# Patient Record
Sex: FEMALE | URBAN - METROPOLITAN AREA
[De-identification: names, ages, dates, MRNs, and addresses within clinical notes are randomized per-mention and may not be internally consistent; named-entity substitution may affect disease eponyms.]

---

## 2024-01-09 ENCOUNTER — TELEPHONE (OUTPATIENT)
Dept: SURGERY | Age: 54
End: 2024-01-09

## 2024-01-09 NOTE — TELEPHONE ENCOUNTER
Patient called looking to possibly set up consult with , she has had 5 reconstructive surgeries, a mastectomy, and is now looking to get the ELIDA flap procedure.     She would like some information on , the office, and has a few general questions. Please call her [phone]

## 2024-01-11 NOTE — TELEPHONE ENCOUNTER
I returned the patients call mentioned below. The patient had questions, which were answered. The patient declined a new patient appointment with Dr. Serrano because he does not be do the amount of DIEPS a year that she would like.     I will close this phone note.